# Patient Record
(demographics unavailable — no encounter records)

---

## 2024-11-22 NOTE — HISTORY OF PRESENT ILLNESS
[de-identified] : Body part: neck Better/ Worse/ Same since last visit: better Treatments since last visit: cervical spine MRI at , [facility], Physical therapy Difficulty with: Stiffness with motion Radicular symptoms: None Current medications for pain: None Assistive walking device: none   Today's Pain:  1/10

## 2024-11-22 NOTE — PHYSICAL EXAM
[de-identified] : Constitutional: Well groomed and developed.  Respiratory: Normal, unlabored breathing. No use of accessory muscles.  Skin: No rashes or ulcers. Skin warm and dry.  Psychiatric: Oriented to time, place, person and event. No acute distress.   Neck:    Posture: normal   AROM:  Flexion- chin to chest  Extension- 10 R rotation- 45 L rotation- 45 Moderate pain with neck ROM.  Gait: Heel to toe Patient able to walk on toes and heels.   Tenderness:  Cervical: Spasm of left trapezius   Motor:                        R               L              UE:                   Deltoid            5                5 WE                 5                5 Triceps          5                5                5                5 Intrinsics       5                5 Biceps          5                5                                  R         L DTR:  Biceps:                     2+        2+ Brachioradialis:        2+        2+ Triceps:                   2+         2+   Sensory: Light touch sensation intact on C5-T1  Spurling sign: Normal  Babinski's sign: Negative Bilaterally Anaya's sign: Negative Bilaterally  Abduction sign: Negative Bilaterally

## 2024-11-22 NOTE — ASSESSMENT
[FreeTextEntry1] : 8/2024 MRI of C-Spine was reviewed today and is as follows:  HNP C4-5, C6-7 with mild to moderate stenosis  HNP C5-6 with moderate to severe stenosis   67 yo female presents today for eval of her neck pain. MRI above with severe stenosis at C5-6. However, patient with improvements with conservative modalities. Recommend continuing home stretching.    - Patient will continue HEP as detailed at PT. Emphasized daily stretching and strengthening.   - PT script given today, use as needed  - Recommend NSAIDs PRN - Recommend heating pad use to decrease muscle spasm - Discussed the importance of home exercises, including but not limited to neck stretching and cervical traction   Patient was educated on their diagnosis today. All questions answered and patient expressed understanding.  Follow up PRN

## 2024-11-22 NOTE — DATA REVIEWED
[MRI] : MRI [Cervical Spine] : cervical spine [I independently reviewed and interpreted images and report] : I independently reviewed and interpreted images and report [FreeTextEntry1] : 8/2024 MRI of C-Spine was reviewed today and is as follows:  HNP C4-5, C6-7 with mild to moderate stenosis  HNP C5-6 with moderate to severe stenosis

## 2025-02-24 NOTE — DISCUSSION/SUMMARY
[de-identified] :  CHARLIE BUENO 67 year F was seen and evaluated in office today. Following evaluation, the natural history of the pathology was explained in full to the patient in layman's terms.   Several different treatment options were discussed and explained in full to the patient, along with specific risks and benefits of both surgical and non-surgical treatments. Nonsurgical options including but not limited to Corticosteroid Injection,  Prescription anti-inflammatory medications (both steroidal and non-steroidal), activity modification, non-impact exercise, bracing, and icing were all reviewed.    Discussed risk of morbidity associated with proposed treatment plans. These risks include, but are not limited to, the risk associated with prescription strength medications and possible side effects of these medications which include GI hemorrhage with oral medications along with cardiac/renal issues with long term use.   Furthermore, discussed with CHARLIE that they could also delay any immediate treatment options and continue to observe and self-care for the discussed problem. Discussed Home Exercise Programs as well as Rest, Ice and elevation. Patient had ample time to ask any questions about todays visit and the diagnosis, and all questions were answered. Patient understands the plan going forward.   Lastly, based on this patient's presentation at our office, which is an orthopedic specialist office, this patient inherently / intrinsically has a risk of progression of symptoms/condition, as well as development and/or exacerbation of cardiovascular disease/conditions, obesity, DVT, worsening and chronic pain, and permanent loss of function, as well as decreased abilities to complete activities of daily life.   PLAN:   Patient was given a CSI to the LEFT SA SPACE advised to ice if sore and will observe over the next 24 hours for any redness, swelling or increased discomfort. The indication for this procedure was pain and inflammation. The site was prepped with betadine and sterile technique used. An injection of Lidocaine 5cc of 1% was used Betamethasone (Celestone) 1cc of 6mg.  The patient tolerated procedure well. They were advised to call if redness, pain or fever occur and apply ice for 15 minutes out of every hour for the next 12-24 hours as tolerated. The risks benefits, and alternatives have been discussed. These risks include infection, hemarthrosis, allergic reaction, bleeding and hyperglycemia. Verbal understanding and consent was obtained. There is a moderate risk of morbidity with further treatment, especially from use of prescription strength medication.   The patient f/u in 1 month.   Entered by Quique Aparicio acting as scribe. Dr. Chauhan Attestation The documentation recorded by the scribe, in my presence, accurately reflects the service I, Dr. Chauhan, personally performed, and the decisions made by me with my edits as appropriate.

## 2025-02-24 NOTE — PHYSICAL EXAM
[de-identified] : Constitutional: The patient appears well developed, well nourished. Examination of patients ability to communicate functionally was normal.       Neurologic: Coordination is normal. Alert and oriented to time, place and person. No evidence of mood disorder, calm affect.           LEFT  SHOULDER: Inspection of the shoulder/upper arm is as follows: no swelling, no erythema, no ecchymosis, no atrophy and no deformity.       Palpation of the shoulder/upper arm is as follows: Tenderness is noted at the anterior shoulder and lateral shoulder and bicipital groove       Range of motion of the shoulder is as follows in degrees:   Pain with internal rotation, external rotation, abduction, and forward flexion.       active forward flexion to: 180    active abduction to: 180     internal rotation to: T8    external rotation with arm to side: 90      Strength of the shoulder is as follows:       Forward flexion 5/5   Abduction 5/5   External Rotation 5-/5     Internal Rotation 5/5       Ligament Stability and Special Tests of the shoulder is as follows: Impingement testing is positive. Hawkin's testing is positive. There is positive arc of pain. Shoulder apprehension shows to be negative. Shoulder relocation is negative. Drop-arm testing is negative.       Neurological testing of the shoulder is as follows: reflexes intact, No sensory deficits, motor and sensor intact distally and no scapular winging.     [Left] : left shoulder [AC Joint Arthrosis] : AC Joint Arthrosis

## 2025-02-24 NOTE — HISTORY OF PRESENT ILLNESS
[de-identified] : 02/24/2025  CHARLIE BUENO 67 year y/o F presents today for left shoulder pain. Patient reports an intermittent pain in the shoulder. Patient notes pain anterior shoulder and into the upper arm.  She notes pain at night and pain behind the back motion or taking off the shirt.    Date of Onset: About 3 months ago Mechanism of injury: NKI   Pain:    At Rest: 5/10    With Activity: 4/10   Have you been treated for this in the past? Patient reports her rheumatologist ordered an XR of the shoulder OTC/Rx Medication: Aleve PRN with no relief Heat, Ice, Elevation:  Previous Imaging/Studies: XR at P

## 2025-06-10 NOTE — DISCUSSION/SUMMARY
[de-identified] : Diagnosis SEVERE RIGHT Hip Osteoarthritis   CHARLIE BUENO 67 year  F was seen and evaluated in the office today. Following evaluation, and history of the patient's condition at length, the pathology was explained in full to the patient in layman's terms. Patient has Hip Osteoarthritis. Osteoarthritis is a degenerative joint disease where the cartilage in the hip gradually wears away, leading to pain, stiffness, and reduced mobility. Initially, symptoms may be mild, however this is a progressive condition, and the pain is expected to become more severe and persistent. Advanced stages of hip OA can result in significant disability, making daily activities challenging. I discussed with the patient that since this condition is expected to continue to worsen, they will eventually need a total hip replacement in their life time.   In the interim, discussed with patient several different treatment options regarding managing the gradual loss of function associated with HIP OA, along with specific risks and benefits. Nonsurgical options including but not limited to Corticosteroid Injection, Meloxicam 15mg, Medrol Dose Pack, along with activity modification such as non-impact exercise and organized physical therapy. The risk of morbidity associated with proposed treatments were discussed.   Furthermore, discussed with CHARLIE that they could also delay any immediate treatment options and continue to observe and self-care for the discussed problem. Discussed Home Exercise Programs as well as Rest, Ice and elevation. Patient had ample time to ask any questions about todays visit and the diagnosis, and all questions were answered. Patient verbally expressed they understand the discussion today and the plan going forward. -- Assessment & Indication:  The patient has progressively severe RIGHT hip pain and disability secondary to degenerative joint disease (DJD) and has failed extensive non-surgical treatments, including activity modification, analgesic medications, and therapeutic exercise. Based on persistent symptoms and failure to respond to non-surgical management, I have recommended a total hip replacement (THR).   Procedure Discussion: A detailed discussion was held regarding the nature of the total hip replacement procedure, including surgical steps, expected recovery timeline, and anticipated outcomes. A Total Hip Model of the implant was utilized to demonstrate the various bearing surfaces and functionality. We plan to use a posterior approach, with cementless acetabular fixation and femoral fixation determined by bone quality. The surgical approach may need to be modified if superficial infections arise prior to surgery.   Expected Recovery & Rehabilitation: The patient was informed of the expected time course for return of function and pain relief. The importance of compliance with postoperative instructions was emphasized to facilitate optimal recovery and minimize complications. Discharge planning was discussed, with a focus on the patient returning home after surgery. Arrangements should be made for assistance at home, and home care nursing and physical therapy will be initiated if appropriate and covered by insurance.   Outcomes & Expectations: We reviewed the expected surgical outcomes, the likelihood of satisfaction after full recovery, and potential causes of dissatisfaction, including the possibility of recurrent pain, lack of improvement, or worsening pain.   Risks & Complications: The patient was informed in detail about the risks associated with total hip replacement, including but not limited to: Surgical risks: Infection, wound complications, bone fracture, prosthetic hip dislocation, nerve injury, vascular injury, hemorrhage, limb length inequality, fixation failure, persistent pain, and the need for reoperation. Perioperative medical risks: Deep vein thrombosis (DVT), pulmonary embolism, heart attack, stroke, cardiopulmonary complications, and other risks related to medical comorbidities, including elevated body mass index (BMI). Anesthesia risks: Potential complications related to anesthesia, including death, were discussed. Though I defer to the anesthesia team to discuss complications of anesthesia procedures.   To mitigate these risks, we will use sterile technique, perioperative antibiotics, and DVT prophylaxis when appropriate. The patient will be monitored postoperatively in the office setting to track recovery progress.   Heterotopic Ossification Prevention: The patient was advised of the potential for heterotopic ossification following total hip arthroplasty and the use of a two-week course of Celebrex 200 mg BID for prevention. If medically contraindicated, a single preoperative radiation treatment (700 cGy) to the hip implant site may be an alternative, requiring a consultation with a Radiation Oncologist.   Implant Selection & Durability: We discussed the durability of prosthetic hips and potential long-term concerns, including wear, osteolysis, and loosening. I plan to use a POLAR R3   implant for this patient, as I am most comfortable utilizing it in primary THRs. If the patient prefers a different implant brand/type, they may request it, and I will consider the request or suggest an additional surgical opinion from a surgeon using that brand.   Preoperative Medical Clearance: The patient was advised of the need for a preoperative medical risk evaluation by their primary care physician (PCP). Additional subspecialty clearances, such as cardiac evaluation, may be required based on the PCPs assessment.   Informed Consent & Next Steps: The risks, benefits, and alternatives to surgery were discussed at length. The patient actively participated in the discussion, had their questions answered, and agreed to proceed with elective THR. They were instructed to coordinate with my surgical team for scheduling, preoperative testing, and medical optimization.   Follow-up will be arranged for surgery, or sooner if needed.

## 2025-06-10 NOTE — HISTORY OF PRESENT ILLNESS
[de-identified] : Date of Injury/Onset: Years of pain, worsened significantly over the last 2 year Mechanism of injury: NKI Have you been treated for this in the past? YES Have you had surgery for this in the past? NO Physical Therapy/ HEP: 8 sessions in 2024 for right hip OTC Medicines: Tylenol for full body pain RX medicines: Hydroxychloroquine Heat, Ice, Elevation: None CSI or Gel Injections:  None Other Previous Treatment:  Prior Imaging/Studies: Xrays in system   Pain: At Rest: 5/10 With Activity: 5/10 Quality of symptoms: Pain lateral hip and catching, crunching sensations. pain is dull/achy, sharp and limiting ADLs   Affecting Sleep: Yes Stiffness upon waking, lasting greater than 30mins: Yes Difficulty with stairs: Yes Difficulty getting in and out of car: Yes Sit to stand stiffness: Yes Affects walking short/long distances? Yes Home/Work/Recreation affected? Yes   This is not a Work-Related Injury being treated under Worker's Compensation. This is not an athletic injury occurring associated with an interscholastic or organized sports team.

## 2025-06-10 NOTE — PHYSICAL EXAM
[4___] : abduction 4[unfilled]/5 [5___] : adduction 5[unfilled]/5 [] : patient ambulates without assistive device [Right] : right hip with pelvis [All Views] : anteroposterior, lateral [Severe arthritis (Tonnis Grade 3)] : Severe arthritis (Tonnis Grade 3) [FreeTextEntry8] : iliopsoas TTP [de-identified] : Limping  [FreeTextEntry9] : Severe bone on bone loss of joint space with large osteophytes and cystic changes in the femoral head and acetabular surface. Flattening of the femoral head with associated head/neck thickening.